# Patient Record
Sex: MALE | Race: BLACK OR AFRICAN AMERICAN | Employment: UNEMPLOYED | URBAN - METROPOLITAN AREA
[De-identification: names, ages, dates, MRNs, and addresses within clinical notes are randomized per-mention and may not be internally consistent; named-entity substitution may affect disease eponyms.]

---

## 2022-08-21 ENCOUNTER — HOSPITAL ENCOUNTER (EMERGENCY)
Age: 37
Discharge: HOME | End: 2022-08-21
Attending: STUDENT IN AN ORGANIZED HEALTH CARE EDUCATION/TRAINING PROGRAM
Payer: MEDICAID

## 2022-08-21 VITALS
TEMPERATURE: 98.1 F | DIASTOLIC BLOOD PRESSURE: 81 MMHG | HEART RATE: 63 BPM | OXYGEN SATURATION: 100 % | SYSTOLIC BLOOD PRESSURE: 131 MMHG | RESPIRATION RATE: 16 BRPM

## 2022-08-21 DIAGNOSIS — T16.1XXA FOREIGN BODY OF RIGHT EAR, INITIAL ENCOUNTER: Primary | ICD-10-CM

## 2022-08-21 PROCEDURE — 75810000145 HC RMVL FB EAR W/O GEN ANES

## 2022-08-21 PROCEDURE — 99282 EMERGENCY DEPT VISIT SF MDM: CPT

## 2022-08-21 NOTE — ED PROVIDER NOTES
EMERGENCY DEPARTMENT HISTORY AND PHYSICAL EXAM      Date: 8/21/2022  Patient Name: Pranav Mccullough    History of Presenting Illness     Chief Complaint   Patient presents with    Foreign Body in Ear         History Provided By:Patient     Chief Complaint: Brook Arechiga from Q-tip stuck in right ear  Duration: 4 hours  Timing: Acute  Location: Right ear  Quality: Foreign bodySeverity: Mild  Modifying Factors: None  Associated Symptoms: None      History (Context): Pranav Mccullough is a 39 y.o. male with a past medical history significant for known comes into the ED today due to having cotton from a Q-tip secondary ear. Was cleaning his ears, had it to try to clean inside and when he pulled out the Q-tip the cotton was left inside. Has no pain. Just feels a foreign body sensation. This happened this morning. PCP: None        Past History     Past Medical History:   Past Medical History:   Diagnosis Date    Inguinal hernia        Past Surgical History:  History reviewed. No pertinent surgical history. Family History:  Family History   Problem Relation Age of Onset    Hypertension Mother        Social History:   Social History     Tobacco Use    Smoking status: Every Day     Packs/day: 0.25     Years: 20.00     Pack years: 5.00     Types: Cigarettes    Smokeless tobacco: Never   Substance Use Topics    Alcohol use: Yes     Comment: occasionally    Drug use: Yes     Types: Marijuana     Comment: pain pills       Allergies:  No Known Allergies    PMH, PSH, family history, social history, allergies reviewed with the patient with significant items noted above. Review of Systems   Review of Systems   Constitutional:  Negative for chills and fever. HENT:  Negative for sore throat. Eyes:  Negative for visual disturbance. Negative recent vision problems   Respiratory:  Negative for shortness of breath. Cardiovascular:  Negative for chest pain. Gastrointestinal:  Negative for abdominal pain, diarrhea and nausea. Genitourinary:  Negative for difficulty urinating. Musculoskeletal:  Negative for myalgias. Skin:  Negative for rash. Neurological:  Negative for headaches. Negative altered level of consciousness   All other systems reviewed and are negative. Physical Exam     Vitals:    08/21/22 0853   BP: 131/81   Pulse: 63   Resp: 16   Temp: 98.1 °F (36.7 °C)   SpO2: 100%       Physical Exam  Vitals and nursing note reviewed. Constitutional:       General: He is not in acute distress. Appearance: Normal appearance. HENT:      Head: Normocephalic and atraumatic. Right Ear: A foreign body is present. Mouth/Throat:      Mouth: Mucous membranes are moist.   Eyes:      General: No scleral icterus. Conjunctiva/sclera: Conjunctivae normal.   Cardiovascular:      Rate and Rhythm: Normal rate and regular rhythm. Pulmonary:      Effort: Pulmonary effort is normal.      Breath sounds: Normal breath sounds. Abdominal:      General: There is no distension. Palpations: Abdomen is soft. Tenderness: There is no abdominal tenderness. Musculoskeletal:         General: No deformity. Normal range of motion. Cervical back: Normal range of motion and neck supple. Skin:     General: Skin is warm and dry. Findings: No rash. Neurological:      General: No focal deficit present. Mental Status: He is alert and oriented to person, place, and time. Mental status is at baseline. Psychiatric:         Mood and Affect: Mood normal.         Behavior: Behavior normal.       Diagnostic Study Results     Labs -   No results found for this or any previous visit (from the past 12 hour(s)). Labs Reviewed - No data to display    Radiologic Studies -   No orders to display     CT Results  (Last 48 hours)      None          CXR Results  (Last 48 hours)      None            The laboratory results, imaging results, and other diagnostic exams were reviewed in the EMR.     Medical Decision Making   I am the first provider for this patient. I reviewed the vital signs, available nursing notes, past medical history, past surgical history, family history and social history. Vital Signs-Reviewed the patient's vital signs. Records Reviewed: Personally, on initial evaluation    MDM:   Sera Esquivel presents with complaint of cotton from Q-tip in right ear  DDX includes but is not limited to: Foreign body, external or internal ear trauma, perforated tympanic membrane, externa  Orders as below:  Orders Placed This [facility]        ED Course:   ED Course as of 08/21/22 1215   Sun Aug 21, 2022   [age]-year-old male, no past medical history presenting today for cotton from Q-tip in right ear. Reports no pain, bleeding, trauma. States that he was cleaning the inside of his ears when he pulled the Q-tip out the cotton was still inside. Came in for removal today. On physical exam patient is very well-appearing, visualized Q-tip cotton within the right ear canal approximately mid depth. Using direct visualization a small mosquito forcep was used to grab the most external portion of the cotton and was gently removed. The entire foreign body was removed. Ear canal was reexamined with no retained foreign bodies. No trauma noted. TM intact. Patient is very ecstatic and happy that we have removed the cotton from his ear. Discussed return precautions. Patient will be discharged at this time.  [ZS]      ED Course User Index  [ZS] Espiridion Kanner, DO           Procedures:  Foreign Body Removal    Date/Time: 8/21/2022 12:11 PM  Performed by: Espiridion Kanner, DO  Authorized by: Espiridion Kanner, DO     Consent:     Consent obtained:  Verbal    Consent given by:  Patient    Risks, benefits, and alternatives were discussed: yes      Risks discussed:  Bleeding, infection, pain, worsening of condition and incomplete removal    Alternatives discussed:  No treatment  Universal protocol: Procedure explained and questions answered to patient or proxy's satisfaction: yes    Location:     Location:  Ear    Ear location:  R ear  Procedure type:     Procedure complexity:  Simple  Procedure details:     Localization method:  Visualized    Removal mechanism:  Hemostat    Foreign bodies recovered:  1    Description:  Cotton Q-tip    Intact foreign body removal: yes    Post-procedure details:     Neurovascular status: intact      Confirmation:  No additional foreign bodies on visualization    Procedure completion:  Tolerated well, no immediate complications        Diagnosis and Disposition     CLINICAL IMPRESSION:  1. Foreign body of right ear, initial encounter      There are no discharge medications for this patient. Disposition: Home    Patient condition at time of disposition: Improved    DISCHARGE NOTE:   Pt has been reexamined. Patient has no new complaints, changes, or physical findings. Care plan outlined and precautions discussed. Results were reviewed with the patient. All medications were reviewed with the patient. All of pt's questions and concerns were addressed. Alarm symptoms and return precautions associated with chief complaint and evaluation were reviewed with the patient in detail. The patient demonstrated adequate understanding. Patient was instructed to follow up with PCP, as well as strict return precautions to the ED upon further deterioration. Patient is ready to go home. The patient is happy with this plan        Dragon Disclaimer     Please note that this dictation was completed with Engrade, the computer voice recognition software. Quite often unanticipated grammatical, syntax, homophones, and other interpretive errors are inadvertently transcribed by the computer software. Please disregard these errors. Please excuse any errors that have escaped final proofreading.       Catherine Cedillo, [address] [facility] Emergency Medicine  PGY-II

## 2022-08-21 NOTE — DISCHARGE INSTRUCTIONS
Removed the cotton from a Q-tip in your right ear. There is no other trauma noted in her ear. It is recommended that you do not insert Q-tips fully into her ear and only clean the outside with them. If you develop any new or worsening symptoms including hearing loss, significant pain, drainage, bleeding from her ear fever, chills or other concerning symptom return to the ER for further evaluation.

## 2023-07-30 ENCOUNTER — APPOINTMENT (OUTPATIENT)
Facility: HOSPITAL | Age: 38
End: 2023-07-30
Payer: MEDICAID

## 2023-07-30 ENCOUNTER — HOSPITAL ENCOUNTER (EMERGENCY)
Facility: HOSPITAL | Age: 38
Discharge: HOME | End: 2023-07-30
Attending: EMERGENCY MEDICINE
Payer: MEDICAID

## 2023-07-30 VITALS
WEIGHT: 190 LBS | HEIGHT: 67 IN | RESPIRATION RATE: 18 BRPM | BODY MASS INDEX: 29.82 KG/M2 | HEART RATE: 81 BPM | TEMPERATURE: 99 F | SYSTOLIC BLOOD PRESSURE: 126 MMHG | DIASTOLIC BLOOD PRESSURE: 63 MMHG | OXYGEN SATURATION: 100 %

## 2023-07-30 DIAGNOSIS — S66.812A EXTENSOR TENDON RUPTURE OF HAND, LEFT, INITIAL ENCOUNTER: Primary | ICD-10-CM

## 2023-07-30 PROCEDURE — 99283 EMERGENCY DEPT VISIT LOW MDM: CPT

## 2023-07-30 PROCEDURE — 6370000000 HC RX 637 (ALT 250 FOR IP): Performed by: EMERGENCY MEDICINE

## 2023-07-30 PROCEDURE — 73140 X-RAY EXAM OF FINGER(S): CPT

## 2023-07-30 RX ORDER — IBUPROFEN 600 MG/1
600 TABLET ORAL EVERY 6 HOURS PRN
Qty: 12 TABLET | Refills: 0 | Status: SHIPPED | OUTPATIENT
Start: 2023-07-30

## 2023-07-30 RX ORDER — IBUPROFEN 600 MG/1
600 TABLET ORAL
Status: COMPLETED | OUTPATIENT
Start: 2023-07-30 | End: 2023-07-30

## 2023-07-30 RX ADMIN — IBUPROFEN 600 MG: 600 TABLET, FILM COATED ORAL at 19:38

## 2023-07-30 ASSESSMENT — ENCOUNTER SYMPTOMS
DIARRHEA: 1
RESPIRATORY NEGATIVE: 1
EYES NEGATIVE: 1

## 2023-07-30 ASSESSMENT — PAIN - FUNCTIONAL ASSESSMENT: PAIN_FUNCTIONAL_ASSESSMENT: 0-10

## 2023-07-30 ASSESSMENT — PAIN SCALES - GENERAL: PAINLEVEL_OUTOF10: 5
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
## 2023-07-30 NOTE — ED NOTES
Discharge instructions reviewed with patient. Patient verbalized understanding. Patient advised to follow up as directed on discharge instructions. Patient denies questions, needs or concerns at this time. Patient verbalized understanding. No s/sx of distress noted.        Farrah Mcnair RN  07/30/23 8591

## 2023-07-30 NOTE — DISCHARGE INSTRUCTIONS
It appears you have ruptured a tendon that make sure finger straighten or extend. You need to follow closely with a hand surgeon as he will likely need surgical intervention. Make sure to follow-up with the hand surgeon this week, keep the splint on, takes medication for pain control, and please return if you are at all worsened or concerned.

## 2023-08-01 ENCOUNTER — TELEPHONE (OUTPATIENT)
Age: 38
End: 2023-08-01

## 2023-08-01 NOTE — TELEPHONE ENCOUNTER
New Patient girlfriend , Kelsey Gottlieb called and said that the patient was seen at [facility] ED on 7/30/23 for a Left Finger Laceration. Xray was taken. No Surgery. Ms. Clif would like to know if Maria E Slaughter will be able to see the patient. Patient is able to go to [facility] or [facility]. Patient can be reached at tel. [phone] . Ms. Mauricio Carreon can be reached at  [facility]. [phone].

## 2023-08-04 ENCOUNTER — OFFICE VISIT (OUTPATIENT)
Age: 38
End: 2023-08-04
Payer: MEDICAID

## 2023-08-04 VITALS — HEIGHT: 67 IN | WEIGHT: 187 LBS | BODY MASS INDEX: 29.35 KG/M2

## 2023-08-04 DIAGNOSIS — M20.011 MALLET DEFORMITY OF RIGHT LITTLE FINGER: Primary | ICD-10-CM

## 2023-08-04 PROCEDURE — 99203 OFFICE O/P NEW LOW 30 MIN: CPT | Performed by: ORTHOPAEDIC SURGERY

## 2023-08-04 NOTE — PROGRESS NOTES
deformity at the DIP joint of approximately 75 to 80 degrees. Grossly neurovascularly intact distally. No active extension. Minimal pain with passive extension. Mildly tender at the DIP joint. Imaging:     Xray Result (most recent):  XR FINGER LEFT (MIN 2 VIEWS) 07/30/2023    Narrative  EXAM: X-ray of the fifth digit of the left hand    INDICATION:  Pain and deformity    TECHNIQUE: 3 views of the fifth digit of the left hand    COMPARISON: None    FINDINGS:  There is a flexion deformity of the left distal interphalangeal joint. There  appears to be a small ossific fragment near the PIP. This likely represents a  small avulsion from the distal phalanx. No dislocation identified. No erosions  or periostitis seen. No lytic or blastic focus identified. The soft tissues are  unremarkable. Impression  1. Flexion deformity of the distal interphalangeal joint with a small avulsion. Likely injury to the extensor tendon. Impression     Diagnosis Orders   1. Mallet deformity of right little finger  [facility] Occupational Therapy [facility]            Plan:     Finger splint reapplied. Referral to Occupational Therapy for mallet splint. Instructed patient to wear the splint at all times for the next 6 to 8 weeks followed by only night time for 6 weeks. Return in about 2 months (around 10/4/2023) for reevaluation and transition to night splinting. Plan was reviewed with patient, who verbalized agreement and understanding of the plan    Note: This note was completed using voice recognition software.   Any typographical/name errors or mistakes are unintentional.

## 2023-08-13 ENCOUNTER — HOSPITAL ENCOUNTER (EMERGENCY)
Facility: HOSPITAL | Age: 38
Discharge: HOME | End: 2023-08-13
Attending: EMERGENCY MEDICINE
Payer: MEDICAID

## 2023-08-13 VITALS
HEART RATE: 98 BPM | SYSTOLIC BLOOD PRESSURE: 126 MMHG | TEMPERATURE: 98.5 F | DIASTOLIC BLOOD PRESSURE: 69 MMHG | OXYGEN SATURATION: 99 % | RESPIRATION RATE: 16 BRPM

## 2023-08-13 DIAGNOSIS — L03.116 CELLULITIS OF LEFT LOWER EXTREMITY: Primary | ICD-10-CM

## 2023-08-13 PROCEDURE — 99283 EMERGENCY DEPT VISIT LOW MDM: CPT

## 2023-08-13 PROCEDURE — 6370000000 HC RX 637 (ALT 250 FOR IP): Performed by: EMERGENCY MEDICINE

## 2023-08-13 RX ORDER — CEPHALEXIN 500 MG/1
500 CAPSULE ORAL 4 TIMES DAILY
Qty: 28 CAPSULE | Refills: 0 | Status: SHIPPED | OUTPATIENT
Start: 2023-08-13 | End: 2023-08-20

## 2023-08-13 RX ORDER — CEPHALEXIN 500 MG/1
500 CAPSULE ORAL 2 TIMES DAILY
Qty: 14 CAPSULE | Refills: 0 | Status: SHIPPED | OUTPATIENT
Start: 2023-08-13 | End: 2023-08-13 | Stop reason: SDUPTHER

## 2023-08-13 RX ORDER — CEPHALEXIN 250 MG/1
500 CAPSULE ORAL
Status: COMPLETED | OUTPATIENT
Start: 2023-08-13 | End: 2023-08-13

## 2023-08-13 RX ADMIN — CEPHALEXIN 500 MG: 250 CAPSULE ORAL at 21:41

## 2023-08-13 ASSESSMENT — PAIN - FUNCTIONAL ASSESSMENT: PAIN_FUNCTIONAL_ASSESSMENT: 0-10

## 2023-08-13 ASSESSMENT — PAIN SCALES - GENERAL: PAINLEVEL_OUTOF10: 7

## 2023-08-13 ASSESSMENT — PAIN DESCRIPTION - LOCATION: LOCATION: LEG

## 2023-08-13 ASSESSMENT — PAIN DESCRIPTION - ORIENTATION: ORIENTATION: LEFT

## 2023-08-14 NOTE — ED PROVIDER NOTES
[facility] EMERGENCY DEPT  EMERGENCY DEPARTMENT ENCOUNTER      Pt Name: Deanna Monk  MRN: [id number]  [address] [facility] 1985  Date of evaluation: 8/13/2023  Provider: Kevin Treadwell MD    [address]       Chief Complaint   Patient presents with    Leg Swelling     L    Leg Pain     L    Insect Bite         HISTORY OF PRESENT ILLNESS   (Location/Symptom, Timing/Onset, Context/Setting, Quality, Duration, Modifying Factors, Severity)  Note limiting factors. Deanna Monk is a 40 y.o. male who presents to the emergency department     59-year-old male without medical issues to the emergency department because of a small area below his left knee which he thinks is a bite. He says it is warm tender to touch and painful. No reported trauma nothing makes it better or worse with walking. No treatment with medications no other issues expressed. The history is provided by the patient. No  was used. Nursing Notes were reviewed. REVIEW OF SYSTEMS    (2-9 systems for level 4, 10 or more for level 5)     Review of Systems   Skin:  Positive for rash and wound. Except as noted above the remainder of the review of systems was reviewed and negative. PAST MEDICAL HISTORY     Past Medical History:   Diagnosis Date    Inguinal hernia          SURGICAL HISTORY       Past Surgical History:   Procedure Laterality Date    HERNIA REPAIR           CURRENT MEDICATIONS       Current Discharge Medication List        CONTINUE these medications which have NOT CHANGED    Details   ibuprofen (IBU) 600 MG tablet Take 1 tablet by mouth every 6 hours as needed for Pain  Qty: 12 tablet, Refills: 0             ALLERGIES     Patient has no known allergies.     FAMILY HISTORY       Family History   Problem Relation Age of Onset    Hypertension Mother           SOCIAL HISTORY       Social History     Socioeconomic History    Marital status: Single     Spouse name: None    Number of children: None    Years of education: None

## 2023-08-14 NOTE — ED NOTES
Aox4 with cc of bite at LLE. Site is red just below the knee, tender to palp. States it is painful and has some numbness present. Does not know if it was a spider bite or insect bite.       Chastity Pierre RN  08/13/23 0548

## 2023-08-14 NOTE — ED TRIAGE NOTES
Pt states yesterday night pain 7/10 started to L leg below knee area. Some swelling and redness. Believes bit by insect.

## 2023-08-14 NOTE — ED NOTES
Discharge teaching provided to pt regarding treatment received, medications prescribed, and follow-up care. Pt verbalized understanding directions and follow up care. Pt left ambulatory with discharge paperwork in hand.       Trevin Collins RN  08/13/23 0720

## 2023-08-23 ENCOUNTER — HOSPITAL ENCOUNTER (OUTPATIENT)
Facility: HOSPITAL | Age: 38
Discharge: HOME | End: 2023-08-26

## 2023-08-23 LAB — SENTARA SPECIMEN COLLECTION: NORMAL

## 2023-10-05 ENCOUNTER — TELEPHONE (OUTPATIENT)
Age: 38
End: 2023-10-05

## 2023-10-05 NOTE — TELEPHONE ENCOUNTER
Patient forgot to schedule his follow up appt on last visit. Note states for patient to RTO on or around 10/4/23. Next available is 11/6/23. Please advise when the patient can come in to be seen. Patient can be reached at [phone].

## 2023-10-13 ENCOUNTER — OFFICE VISIT (OUTPATIENT)
Age: 38
End: 2023-10-13

## 2023-10-13 VITALS — BODY MASS INDEX: 29.35 KG/M2 | WEIGHT: 187 LBS | HEIGHT: 67 IN

## 2023-10-13 DIAGNOSIS — M20.011 MALLET DEFORMITY OF RIGHT LITTLE FINGER: Primary | ICD-10-CM

## 2023-10-13 NOTE — PROGRESS NOTES
seconds. Findings: No bruising or erythema. Neurological:      General: No focal deficit present. Mental Status: He is alert and oriented to person, place, and time. Psychiatric:         Mood and Affect: Mood normal.         Behavior: Behavior normal.          Left little finger: Deformity of the DIP joint nearly completely corrected as it was previously near 90 degrees of flexion. Laceration has completely healed. Grossly neurovascularly intact distally. Range of motion when coached is near completely full with a palm to pulp distance of 0 cm. Imaging:     XR FINGER LEFT (MIN 2 VIEWS) 07/30/2023  Impression  1. Flexion deformity of the distal interphalangeal joint with a small avulsion. Likely injury to the extensor tendon. Impression     Diagnosis Orders   1. Mallet deformity of right little finger              Plan:     Resume activities as tolerated. I recommended patient wear his splint at night for another month or so. Again I emphasized the importance of continue to work on range of motion exercises as well as working on his strength that this may take a couple months to return. Additionally we discussed that the swelling in his finger could last for several months. Return if symptoms worsen or fail to improve. Plan was reviewed with patient, who verbalized agreement and understanding of the plan    Note: This note was completed using voice recognition software.   Any typographical/name errors or mistakes are unintentional.

## 2023-11-11 ENCOUNTER — HOSPITAL ENCOUNTER (EMERGENCY)
Facility: HOSPITAL | Age: 38
Discharge: HOME | End: 2023-11-11
Attending: EMERGENCY MEDICINE
Payer: MEDICAID

## 2023-11-11 ENCOUNTER — APPOINTMENT (OUTPATIENT)
Facility: HOSPITAL | Age: 38
End: 2023-11-11
Payer: MEDICAID

## 2023-11-11 VITALS
RESPIRATION RATE: 16 BRPM | HEIGHT: 67 IN | HEART RATE: 72 BPM | BODY MASS INDEX: 29.03 KG/M2 | TEMPERATURE: 98.1 F | SYSTOLIC BLOOD PRESSURE: 120 MMHG | DIASTOLIC BLOOD PRESSURE: 64 MMHG | WEIGHT: 185 LBS | OXYGEN SATURATION: 100 %

## 2023-11-11 DIAGNOSIS — T14.8XXA PUNCTURE WOUND: ICD-10-CM
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
## 2023-11-11 PROCEDURE — 90715 TDAP VACCINE 7 YRS/> IM: CPT | Performed by: EMERGENCY MEDICINE

## 2023-11-11 PROCEDURE — 99284 EMERGENCY DEPT VISIT MOD MDM: CPT

## 2023-11-11 PROCEDURE — 6360000002 HC RX W HCPCS: Performed by: EMERGENCY MEDICINE

## 2023-11-11 PROCEDURE — 73140 X-RAY EXAM OF FINGER(S): CPT

## 2023-11-11 PROCEDURE — 6370000000 HC RX 637 (ALT 250 FOR IP): Performed by: EMERGENCY MEDICINE

## 2023-11-11 PROCEDURE — 90471 IMMUNIZATION ADMIN: CPT | Performed by: EMERGENCY MEDICINE

## 2023-11-11 RX ORDER — IBUPROFEN 600 MG/1
600 TABLET ORAL
Status: COMPLETED | OUTPATIENT
Start: 2023-11-11 | End: 2023-11-11

## 2023-11-11 RX ORDER — AMOXICILLIN AND CLAVULANATE POTASSIUM 875; 125 MG/1; MG/1
1 TABLET, FILM COATED ORAL 2 TIMES DAILY
Qty: 14 TABLET | Refills: 0 | Status: SHIPPED | OUTPATIENT
Start: 2023-11-11 | End: 2023-11-18

## 2023-11-11 RX ORDER — IBUPROFEN 600 MG/1
600 TABLET ORAL EVERY 6 HOURS PRN
Qty: 20 TABLET | Refills: 0 | Status: SHIPPED | OUTPATIENT
Start: 2023-11-11

## 2023-11-11 RX ORDER — AMOXICILLIN AND CLAVULANATE POTASSIUM 875; 125 MG/1; MG/1
1 TABLET, FILM COATED ORAL
Status: COMPLETED | OUTPATIENT
Start: 2023-11-11 | End: 2023-11-11

## 2023-11-11 RX ADMIN — TETANUS TOXOID, REDUCED DIPHTHERIA TOXOID AND ACELLULAR PERTUSSIS VACCINE, ADSORBED 0.5 ML: 5; 2.5; 8; 8; 2.5 SUSPENSION INTRAMUSCULAR at 17:27

## 2023-11-11 RX ADMIN — IBUPROFEN 600 MG: 600 TABLET, FILM COATED ORAL at 17:27

## 2023-11-11 RX ADMIN — AMOXICILLIN AND CLAVULANATE POTASSIUM 1 TABLET: 875; 125 TABLET, FILM COATED ORAL at 17:27

## 2023-11-11 ASSESSMENT — ENCOUNTER SYMPTOMS
CHEST TIGHTNESS: 0
ABDOMINAL PAIN: 0
EYES NEGATIVE: 1

## 2023-11-11 ASSESSMENT — PAIN SCALES - GENERAL: PAINLEVEL_OUTOF10: 6

## 2023-11-11 ASSESSMENT — PAIN - FUNCTIONAL ASSESSMENT: PAIN_FUNCTIONAL_ASSESSMENT: 0-10

## 2023-11-11 NOTE — ED NOTES
Discharge instructions reviewed with patient. Patient verbalized understanding. Patient advised to follow up as directed on discharge instructions. Patient denies questions, needs or concerns at this time. Patient verbalized understanding. No s/sx of distress noted.        Singh Sprague RN  11/11/23 2673

## 2023-11-11 NOTE — ED PROVIDER NOTES
Behavior: Behavior normal.           Diagnostic Study Results     Labs -  No results found for this or any previous visit (from the past 12 hour(s)). Radiologic Studies -   XR FINGER RIGHT (MIN 2 VIEWS)    (Results Pending)   No fracture or foreign body it interpreted by me      Medical Decision Making   I am the first provider for this patient. I reviewed the vital signs, available nursing notes, past medical history, past surgical history, family history and social history. Vital Signs-Reviewed the patient's vital signs. ED Course: Progress Notes, Reevaluation, and Consults:    Provider Notes (Medical Decision Making):   MDM  Number of Diagnoses or Management Options  Diagnosis management comments: Patient is a 79-year-old male that presents after a dog bite to both index fingers the right being worse than the left with the puncture wound on the right. Patient has some swelling with full range of motion x-ray shows no foreign body or fracture. Patient was given a dose of Augmentin, ibuprofen, updated tetanus, and the patient does not need rabies prophylaxis. We will proceed with close outpatient care and the patient will return if at all worsened or concerned. Workup and recommendations were reviewed with the patient and all questions were answered. The patient understands the plan and will proceed with close outpatient care. I have encouraged the patient to return if at all worsened or concerned.    Jennet Bosworth,  6:25 PM      Patient was given the following medications:  Medications   amoxicillin-clavulanate (AUGMENTIN) 875-125 MG per tablet 1 tablet (1 tablet Oral Given 11/11/23 1727)   ibuprofen (ADVIL;MOTRIN) tablet 600 mg (600 mg Oral Given 11/11/23 1727)   Tetanus-Diphth-Acell Pertussis (BOOSTRIX) injection 0.5 mL (0.5 mLs IntraMUSCular Given 11/11/23 1727)       CONSULTS: (Who and What was discussed)  None    Chronic Conditions: None    Social Determinants affecting Dx or Tx: None    Records Reviewed (source and summary of external notes): Nursing Notes, Old Medical Records, Previous Radiology Studies, and Previous Laboratory Studies    Procedures        Diagnosis     Clinical Impression:   1. Dog bite, initial encounter    2. Puncture wound        Disposition: CALE    Milagro Lopez, [address] [facility] [address] [facility]  [address]  [phone]    Follow up in 1 week(s)  For wound re-check       Disclaimer: Sections of this note are dictated using utilizing voice recognition software. Minor typographical errors may be present. If questions arise, please do not hesitate to contact me or call our department.        Evie Rubin MD  11/11/23 4560

## 2023-11-11 NOTE — DISCHARGE INSTRUCTIONS
Make sure to keep your dog bite clean and dry, there is a high likelihood that it can be infected so take the antibiotics as prescribed, and return if you develop any redness, pain, or swelling, with drainage as that is a sign that it could be infection that can go deep into the finger. Please return if you are at all worsened or concerned.

## 2024-07-02 ENCOUNTER — APPOINTMENT (OUTPATIENT)
Facility: HOSPITAL | Age: 39
End: 2024-07-02
Payer: MEDICAID

## 2024-07-02 ENCOUNTER — HOSPITAL ENCOUNTER (EMERGENCY)
Facility: HOSPITAL | Age: 39
Discharge: HOME | End: 2024-07-02
Payer: MEDICAID

## 2024-07-02 VITALS
HEART RATE: 56 BPM | HEIGHT: 67 IN | BODY MASS INDEX: 26.84 KG/M2 | OXYGEN SATURATION: 99 % | WEIGHT: 171 LBS | TEMPERATURE: 98.3 F | RESPIRATION RATE: 15 BRPM | SYSTOLIC BLOOD PRESSURE: 115 MMHG | DIASTOLIC BLOOD PRESSURE: 62 MMHG

## 2024-07-02 DIAGNOSIS — S16.1XXA ACUTE STRAIN OF NECK MUSCLE, INITIAL ENCOUNTER: Primary | ICD-10-CM

## 2024-07-02 PROCEDURE — 72040 X-RAY EXAM NECK SPINE 2-3 VW: CPT

## 2024-07-02 PROCEDURE — 73030 X-RAY EXAM OF SHOULDER: CPT

## 2024-07-02 PROCEDURE — 99283 EMERGENCY DEPT VISIT LOW MDM: CPT

## 2024-07-02 RX ORDER — CYCLOBENZAPRINE HCL 10 MG
10 TABLET ORAL 2 TIMES DAILY PRN
Qty: 12 TABLET | Refills: 0 | Status: SHIPPED | OUTPATIENT
Start: 2024-07-02 | End: 2024-07-12

## 2024-07-02 RX ORDER — NAPROXEN 500 MG/1
500 TABLET ORAL 2 TIMES DAILY
Qty: 14 TABLET | Refills: 0 | Status: SHIPPED | OUTPATIENT
Start: 2024-07-02 | End: 2024-07-09

## 2024-07-02 RX ORDER — LIDOCAINE 50 MG/G
1 PATCH TOPICAL DAILY
Qty: 10 PATCH | Refills: 0 | Status: SHIPPED | OUTPATIENT
Start: 2024-07-02 | End: 2024-07-12

## 2024-07-02 ASSESSMENT — PAIN - FUNCTIONAL ASSESSMENT: PAIN_FUNCTIONAL_ASSESSMENT: 0-10

## 2024-07-02 ASSESSMENT — ENCOUNTER SYMPTOMS
NAUSEA: 0
DIARRHEA: 0
ABDOMINAL PAIN: 0
VOMITING: 0
SHORTNESS OF BREATH: 0

## 2024-07-02 ASSESSMENT — PAIN SCALES - GENERAL: PAINLEVEL_OUTOF10: 6

## 2024-07-02 NOTE — ED TRIAGE NOTES
Ambulatory to QB with atraumatic left neck and shoulder pain x 3 days, spasms to left upper back and neck with movement

## 2024-07-02 NOTE — ED NOTES
Discharge instructions reviewed with patient. Patient advised to follow up as directed on discharge instructions.  Patient denies questions, needs or concerns at this time.  Patient verbalized understanding. No s/sx of distress noted.     
denies pain/discomfort

## 2024-07-02 NOTE — ED PROVIDER NOTES
SHOULDER LEFT (MIN 2 VIEWS)   Final Result      No bony abnormality seen.            Electronically signed by Noel Ma      XR CERVICAL SPINE (2-3 VIEWS)   Final Result      No bony abnormality seen.         Electronically signed by Noel Ma            Medical Decision Making   I am the first provider for this patient.    I reviewed the vital signs, available nursing notes, past medical history, past surgical history, family history and social history.    Vital Signs-Reviewed the patient's vital signs.    Records Reviewed: Nursing Notes and Old Medical Records (Time of Review: 3:14 PM)    ED Course: Progress Notes, Reevaluation, and Consults:  3:10 PM: Reviewed results and plan with patient. Discussed need for close outpatient follow-up this week for reassessment. Discussed strict return precautions, including extremity weakness, arm swelling, chest pain, or any other medical concerns. Pt in agreement with plan.     Provider Notes (Medical Decision Making): 38-year-old male who presents to the ED due to posterior neck and left shoulder pain x 3 days.  Patient denies trauma.  Afebrile, nontoxic-appearing, looks well.  No erythema/edema/discoloration to neck or shoulder.  Extremity neurovascularly intact.  X-rays negative for acute process.  Do not feel further labs or imaging are warranted in the ED today.  Patient is stable for discharge with close outpatient follow-up for further assessment.  Strict return precautions provided    Diagnosis     Clinical Impression:   1. Acute strain of neck muscle, initial encounter        Disposition: home      [facility] EMERGENCY DEPT  [address] [facility] [address] [address] [phone]    If symptoms worsen    Liz Hines, APRN - CNP  [address]  [address]  [place] [address]  [phone]    Schedule an appointment as soon as possible for a visit       [facility]  [address], [address]  [facility]

## 2024-07-02 NOTE — DISCHARGE INSTRUCTIONS
Blood pressure is little high here today. Monitor home blood pressure readings.      Continue lisinopril 10 mg qd for now.    Take medication as prescribed. Follow-up with your primary care physician within 2 days for reassessment. Bring the results from this visit with you for their review. Return to the ED immediately for any new, worsening, or persistent symptoms, including weakness or any other medical concerns.